# Patient Record
Sex: FEMALE | Race: BLACK OR AFRICAN AMERICAN | Employment: UNEMPLOYED | URBAN - METROPOLITAN AREA
[De-identification: names, ages, dates, MRNs, and addresses within clinical notes are randomized per-mention and may not be internally consistent; named-entity substitution may affect disease eponyms.]

---

## 2023-10-19 ENCOUNTER — APPOINTMENT (OUTPATIENT)
Dept: OBSTETRICS AND GYNECOLOGY | Facility: CLINIC | Age: 38
End: 2023-10-19
Payer: COMMERCIAL

## 2023-10-24 PROBLEM — M54.50 LUMBAR SPINE PAIN: Status: ACTIVE | Noted: 2023-10-24

## 2023-10-24 PROBLEM — M67.02: Status: ACTIVE | Noted: 2023-10-24

## 2023-10-24 PROBLEM — M79.672 PAIN OF LEFT HEEL: Status: ACTIVE | Noted: 2023-10-24

## 2023-10-24 PROBLEM — Z72.89 OTHER PROBLEMS RELATED TO LIFESTYLE: Status: ACTIVE | Noted: 2023-10-24

## 2023-10-24 PROBLEM — M72.2 PLANTAR FASCIITIS, LEFT: Status: ACTIVE | Noted: 2023-10-24

## 2023-10-24 PROBLEM — E66.01 OBESITY, CLASS III, BMI 40-49.9 (MORBID OBESITY) (MULTI): Status: ACTIVE | Noted: 2023-10-24

## 2023-10-24 RX ORDER — ATORVASTATIN CALCIUM 20 MG/1
TABLET, FILM COATED ORAL
COMMUNITY
Start: 2017-01-14 | End: 2023-10-26 | Stop reason: HOSPADM

## 2023-10-26 ENCOUNTER — OFFICE VISIT (OUTPATIENT)
Dept: OBSTETRICS AND GYNECOLOGY | Facility: CLINIC | Age: 38
End: 2023-10-26
Payer: COMMERCIAL

## 2023-10-26 VITALS
BODY MASS INDEX: 46.01 KG/M2 | HEART RATE: 89 BPM | HEIGHT: 64 IN | WEIGHT: 269.5 LBS | SYSTOLIC BLOOD PRESSURE: 120 MMHG | DIASTOLIC BLOOD PRESSURE: 82 MMHG

## 2023-10-26 DIAGNOSIS — Z01.419 WELL WOMAN EXAM: Primary | ICD-10-CM

## 2023-10-26 DIAGNOSIS — N93.9 ABNORMAL UTERINE BLEEDING: ICD-10-CM

## 2023-10-26 PROBLEM — F17.200 CURRENT SMOKER: Status: ACTIVE | Noted: 2023-10-26

## 2023-10-26 PROBLEM — I10 HYPERTENSION: Status: ACTIVE | Noted: 2023-10-26

## 2023-10-26 PROCEDURE — 87624 HPV HI-RISK TYP POOLED RSLT: CPT | Performed by: STUDENT IN AN ORGANIZED HEALTH CARE EDUCATION/TRAINING PROGRAM

## 2023-10-26 PROCEDURE — 99386 PREV VISIT NEW AGE 40-64: CPT | Mod: GC | Performed by: STUDENT IN AN ORGANIZED HEALTH CARE EDUCATION/TRAINING PROGRAM

## 2023-10-26 PROCEDURE — 99386 PREV VISIT NEW AGE 40-64: CPT | Performed by: STUDENT IN AN ORGANIZED HEALTH CARE EDUCATION/TRAINING PROGRAM

## 2023-10-26 PROCEDURE — 90651 9VHPV VACCINE 2/3 DOSE IM: CPT | Mod: GC | Performed by: STUDENT IN AN ORGANIZED HEALTH CARE EDUCATION/TRAINING PROGRAM

## 2023-10-26 PROCEDURE — 88175 CYTOPATH C/V AUTO FLUID REDO: CPT | Mod: TC | Performed by: STUDENT IN AN ORGANIZED HEALTH CARE EDUCATION/TRAINING PROGRAM

## 2023-10-26 PROCEDURE — 87661 TRICHOMONAS VAGINALIS AMPLIF: CPT | Performed by: STUDENT IN AN ORGANIZED HEALTH CARE EDUCATION/TRAINING PROGRAM

## 2023-10-26 PROCEDURE — 87591 N.GONORRHOEAE DNA AMP PROB: CPT | Performed by: STUDENT IN AN ORGANIZED HEALTH CARE EDUCATION/TRAINING PROGRAM

## 2023-10-26 PROCEDURE — 87491 CHLMYD TRACH DNA AMP PROBE: CPT | Performed by: STUDENT IN AN ORGANIZED HEALTH CARE EDUCATION/TRAINING PROGRAM

## 2023-10-26 PROCEDURE — 3079F DIAST BP 80-89 MM HG: CPT | Performed by: STUDENT IN AN ORGANIZED HEALTH CARE EDUCATION/TRAINING PROGRAM

## 2023-10-26 PROCEDURE — 3074F SYST BP LT 130 MM HG: CPT | Performed by: STUDENT IN AN ORGANIZED HEALTH CARE EDUCATION/TRAINING PROGRAM

## 2023-10-26 ASSESSMENT — PAIN SCALES - GENERAL: PAINLEVEL: 0-NO PAIN

## 2023-10-26 NOTE — PROGRESS NOTES
"HPI: Iris Lane is a 38 y.o.  here for an annual physical exam and Pap testing.    HPI: She reports 1 episode of abnormal vaginal bleeding in August. She had her normal menses and the following week had vaginal bleeding similar to her periods that lasted for 7 weeks. This was the first time this happened and she has had no subsequent abnormal bleeding episodes. She denies abdominal or pelvic pain, vaginal discharge, and vaginal odor.      OB history:   -  x2    Gyn history:   Menstrual:  - Menarche at 18 y.o.  - Regular monthly cycles lasting 7 days  - Heaviness: denies menorrhagia  - LMP: 10/2/2023  - Endorses menstrual cramps  No sexual concerns. Not currently sexually active. Has had 2 male partners previously.  Contraception: uses condoms  Desires STI testing today  Incontinence concerns: denies     Screening:  - DV: denies DV. Feels safe at home.  - Diet/Exercise: reports appetite is normal, denies physical activity. Declines dietician referral.    - COVID/flu vaccine: declines  - Gardasil: not vaccinated. Wants HPV vaccination.  - Mammogram: no prior mammogram, due today   - Pap: last Pap 2014 wnl. Denies prior abnormal Pap testing.     The patient's past medical, surgical, family and social histories were updated as indicated.    PMH: HTN  PSH: Tonsillectomy in childhood  Meds: Aleve and tylenol (for headaches and back pain)  Allergies: NKDA  Family History: Brother: HTN, Son: asthma. Denies family history of endometrial, ovarian, pancreatic cancer, or colon cancer. No family history of coagulopathy or bleeding disorders.  Social: Lives with 2 children. Current smoker, declined assistance quitting. Denies alcohol or drug use.    Objective:  Visit Vitals  /82   Pulse 89   Ht 1.626 m (5' 4\")   Wt 122 kg (269 lb 8 oz)   LMP 10/02/2023 (Exact Date)   BMI 46.26 kg/m²   OB Status Having periods   Smoking Status Every Day   BSA 2.35 m²      Physical Exam  Constitutional:       " Appearance: Normal appearance.   Genitourinary:      No lesions in the vagina.      Right Labia: No lesions.     Left Labia: No lesions.       Right Adnexa: not tender, not full and no mass present.     Left Adnexa: not tender, not full and no mass present.     No cervical lesion.      Uterus is not tender.   Breasts:     Right: Normal. No mass, skin change or tenderness.      Left: Normal. No mass, skin change or tenderness.   HENT:      Head: Normocephalic and atraumatic.      Nose: Nose normal.   Eyes:      Extraocular Movements: Extraocular movements intact.      Conjunctiva/sclera: Conjunctivae normal.   Cardiovascular:      Rate and Rhythm: Normal rate and regular rhythm.   Pulmonary:      Effort: Pulmonary effort is normal.   Musculoskeletal:         General: Normal range of motion.      Cervical back: Full passive range of motion without pain.   Neurological:      General: No focal deficit present.      Mental Status: She is alert and oriented to person, place, and time.   Skin:     General: Skin is warm and dry.   Psychiatric:         Behavior: Behavior normal. Behavior is cooperative.       ASSESSMENT & PLAN  Iris Lane is a 38 y.o.  here for annual visit and Pap screening.     Problem List Items Addressed This Visit             ICD-10-CM    Abnormal uterine bleeding N93.9    Relevant Orders    US pelvis transvaginal    Well woman exam - Primary Z01.419    Relevant Orders    BI mammo bilateral screening tomosynthesis    Neisseria gonorrhoeae, Amplified    Chlamydia Trachomatis, Amplified    TRICH VAGINALIS, AMPLIFIED    HIV 1/2 Antigen/Antibody Screen with Reflex to Confirmation    Syphilis Screen with Reflex    Hepatitis C Antibody    Hepatitis B Surface Antigen    THINPREP PAP    Referral to Primary Care    Hemoglobin A1c     Cervical cancer screening   - Gardasil vaccine 1st dose administered today (10/26/2023)    RTC in 1 year for annual visit    Seen and discussed with Dr. Chevy HAYES  MD Mendez

## 2023-10-26 NOTE — PROGRESS NOTES
"HPI: Iris Lane is a 38 y.o.  here for an annual physical exam and Pap testing.    HPI: She reports 1 episode of abnormal vaginal bleeding in August. She had her normal menses and the following week had vaginal bleeding similar to her periods that lasted for 7 weeks. This was the first time this happened and she has had no subsequent abnormal bleeding episodes. She denies abdominal or pelvic pain, vaginal discharge, and vaginal odor.      OB history:   - Delivery type: 2 vaginal deliveries  - Denies Pregnancy complications    Gyn history:   Menstrual:  - Age at menarche/menopause: 18 y.o.  - Regular monthly cycles lasting 7 days  - Heaviness: denies menorrhagia  - LMP: 10/2/2023  - has menstrual cramps  No sexual concerns. Not currently sexually active. Has had 2 male partners previously.  Contraception: uses condoms  Desires STI testing today.  Incontinence concerns: denies     Screening:  - DV: denies DV. Feels safe at home.  - Diet/Exercise: reports appetite is normal, denies physical activity. Declines dietician referral.    - COVID/flu vaccine: declines  - Gardasil: not vaccinated. Wants HPV vaccination.  - Mammogram: no prior mammogram. Wants mammogram.   - Pap: last Pap 2014 wnl. Denies prior abnormal Pap testing.     The patient's past medical, surgical, family and social histories were updated as indicated.    PMH: HTN  PSH: Tonsillectomy in childhood  Meds: aleve and tylenol (for headaches and back pain)  Allergies: NKDA  Family History: Brother: HTN, Son: asthma. Denies family history of endometrial, ovarian, pancreatic cancer, or colon cancer. No family history of coagulopathy or bleeding disorders.  Social: Lives with 2 children. Current smoker, declined assistance quitting. Denies alcohol or drug use.    Objective:  Visit Vitals  /82   Pulse 89   Ht 1.626 m (5' 4\")   Wt 122 kg (269 lb 8 oz)   LMP 10/02/2023 (Exact Date)   BMI 46.26 kg/m²   OB Status Having periods   Smoking " Status Every Day   BSA 2.35 m²      Physical Exam  Constitutional:       Appearance: Normal appearance.   Genitourinary:      No lesions in the vagina.      Right Labia: No lesions.     Left Labia: No lesions.       Right Adnexa: not tender, not full and no mass present.     Left Adnexa: not tender, not full and no mass present.     No cervical lesion.      Uterus is not tender.   Breasts:     Right: Normal. No mass, skin change or tenderness.      Left: Normal. No mass, skin change or tenderness.   HENT:      Head: Normocephalic and atraumatic.      Nose: Nose normal.   Eyes:      Extraocular Movements: Extraocular movements intact.      Conjunctiva/sclera: Conjunctivae normal.   Cardiovascular:      Rate and Rhythm: Normal rate and regular rhythm.   Pulmonary:      Effort: Pulmonary effort is normal.   Musculoskeletal:         General: Normal range of motion.      Cervical back: Full passive range of motion without pain.   Neurological:      General: No focal deficit present.      Mental Status: She is alert and oriented to person, place, and time.   Skin:     General: Skin is warm and dry.   Psychiatric:         Behavior: Behavior normal. Behavior is cooperative.           ASSESSMENT & PLAN  Iris Lane is a 38 y.o.  here for annual visit and Pap screening.     Problem List Items Addressed This Visit             ICD-10-CM    Well woman exam - Primary Z01.419    Relevant Orders    BI mammo bilateral screening tomosynthesis    Neisseria gonorrhoeae, Amplified    Chlamydia Trachomatis, Amplified    TRICH VAGINALIS, AMPLIFIED    HIV 1/2 Antigen/Antibody Screen with Reflex to Confirmation    Syphilis Screen with Reflex    Hepatitis C Antibody    Hepatitis B Surface Antigen    THINPREP PAP    Referral to Primary Care    Hemoglobin A1c    Abnormal uterine bleeding N93.9    Relevant Orders    US pelvis transvaginal     #Cervical Cancer Prevention  -Gardasil vaccine 1st dose administered today  (10/26/2023).    RTC in 1 year for annual visit.

## 2023-10-26 NOTE — PROGRESS NOTES
I saw and evaluated the patient. I personally obtained the key and critical portions of the history and physical exam or was physically present for key and critical portions performed by the resident/fellow. I reviewed the resident/fellow's documentation and discussed the patient with the resident/fellow. I agree with the resident/fellow's medical decision making as documented in the note.    Jaja Reece MD

## 2023-10-27 LAB
C TRACH RRNA SPEC QL NAA+PROBE: NEGATIVE
N GONORRHOEA DNA SPEC QL PROBE+SIG AMP: NEGATIVE
T VAGINALIS RRNA SPEC QL NAA+PROBE: NEGATIVE

## 2023-11-09 LAB
CYTOLOGY CMNT CVX/VAG CYTO-IMP: NORMAL
HPV HR GENOTYPES PNL CVX NAA+PROBE: NEGATIVE
HPV HR GENOTYPES PNL CVX NAA+PROBE: NEGATIVE
HPV16 DNA SPEC QL NAA+PROBE: NEGATIVE
HPV18 DNA SPEC QL NAA+PROBE: NEGATIVE
LAB AP HPV GENOTYPE QUESTION: YES
LAB AP HPV HR: NORMAL
LABORATORY COMMENT REPORT: NORMAL
LMP START DATE: NORMAL
PATH REPORT.TOTAL CANCER: NORMAL

## 2024-08-15 ENCOUNTER — HOSPITAL ENCOUNTER (OUTPATIENT)
Dept: RADIOLOGY | Facility: HOSPITAL | Age: 39
Discharge: HOME | End: 2024-08-15
Payer: COMMERCIAL

## 2024-08-15 DIAGNOSIS — N93.9 ABNORMAL UTERINE BLEEDING: ICD-10-CM

## 2024-08-15 PROCEDURE — 76830 TRANSVAGINAL US NON-OB: CPT
